# Patient Record
Sex: MALE | Race: WHITE | URBAN - METROPOLITAN AREA
[De-identification: names, ages, dates, MRNs, and addresses within clinical notes are randomized per-mention and may not be internally consistent; named-entity substitution may affect disease eponyms.]

---

## 2019-12-04 ENCOUNTER — TELEPHONE (OUTPATIENT)
Dept: TRANSPLANT | Facility: CLINIC | Age: 49
End: 2019-12-04

## 2019-12-04 NOTE — TELEPHONE ENCOUNTER
"[facility] BREEZE  [id number]      LIVING LIVER DONOR EVALUATION  Donor First Name Jovanni Donor MRN    Donor Middle Name Breezy Completed 2019 9:32 AM   Donor Last Name Diana Record Id [id number]    1970     BREEZE Screen PASSED     Intended Recipient  Recipient First Name Po Relationship Full Sibling   Recipient Last Name Diana Recipient Diagnosis    Recipient  1972 Recipient Insurance Provider    Recipient MRN  Recipient Insurance Type    Recipient Height      Recipient Weight      Recipient's ABO      Donor Information  Age 49 Race    Ht 188 cm (6' 2'') Ethnicity Not /   Wt 96.6 kg (213 lbs) Preferred Language English   BMI 27.40 kg/m   Required No   Gender Male Blood Type A   Demographics  Home Address [address] [place] [address] # [phone]   [facility] # [phone]   [facility] [address] Type Work - Private   Country [place] Preferred Contact day Mon, Fri, Thur, Wed, Tue   Email [email or web address] Preferred Contact time 11:00 AM-1:00 PM, 1:00 PM-4:00 PM, 09:00 AM-11:00 AM   &&   Donor's Medical Information  Medical History None Reported Medications None Reported   Surgical History Circumcision Allergies NKDA   Social History EtOH: Daily (1-2 drinks/day)   Illicit Drug Use: Denies   Tobacco: Denies Self-Reported Functional Status \"I am able to participate in moderate recreational activities like golf, double tennis, dancing, throwing a baseball or football\"   Family Medical History Cancer (Father)   Clots or Clotting Disease (Sibling, Father, Cousin)   Diabetes (Grandparent)   Heart Disease (Father, Aunt or Uncle)   Hypertension (Father, Sibling, Grandparent, Aunt or Uncle)   Inflammatory Bowel Disease (denies)   Liver Disease (Sibling)   Mental Illness (denies) Exercise Frequency Exercise (2 X per week)   Review of Organ Systems  Review of Systems Airway or Lungs: No   Blood Disorder: No   Cancer: No "   Diabetes,Thyroid,Adrenal,Endocrine Disorder: No   Digestive or Liver: No   Heart or Circulatory System: No   Immune Diseases: No   Kidneys and Bladder: No   Male Health: No   Muscles,Bones,Joints: No   Neuro: No   Psych: No   &&   Donor's Social Information  Marital Status  Level of Education Some college education   Medical Insurance Status Has medical insurance Employment Status Full Time   Living Accommodation Lives in rented accommodation Employer [email or web address]   Living Arrangement With spouse Occupation    High Risk Behaviors Blood transfusion < 12 months. (NO)   Commercial sex < 12 months. (NO)   Illicit IV drug use < 5yrs. (NO)   Male:male sexual contact < 5yrs. (NO)   Other high risk sexual contact < 12 months. (NO)     Reason for Donation  Referral Tx Candidate Reason for Donation Because its my sister.   Permission to Disclose Inquiry  Patient Comments    Donor Motivation Highly motivated donor     PCP Contact  PCP Name [facility]   PCP [facility]   PCP Phone [phone]   Emergency Contact  First Name Laura First Name Shelly   Last Name Micaela Last Name Micaela   Phone # [phone] Phone # [phone]   Phone Type Mobile Phone Type Mobile   Relationship Spouse Relationship Mother   Office Use  Reviewed By    Reviewed 11/27/2019   Admin Folder Accept   Comments    Lost for Followup    Extended Comments    BREEZE ID [email or web address]:[id number]   survey status completed   Activity History  Call  Task    Due Date 12/4/2019   Last Modified Date/Time 12/4/2019 7:40 AM   Comments

## 2019-12-09 ENCOUNTER — MEDICAL CORRESPONDENCE (OUTPATIENT)
Dept: HEALTH INFORMATION MANAGEMENT | Facility: CLINIC | Age: 49
End: 2019-12-09

## 2019-12-18 ENCOUNTER — TELEPHONE (OUTPATIENT)
Dept: TRANSPLANT | Facility: CLINIC | Age: 49
End: 2019-12-18

## 2019-12-18 NOTE — TELEPHONE ENCOUNTER
Angelo is having a VERY hard time getting labs done.Everyplace in [place] wants his insurance and money up front. His wife(who's a )advised him NOT to do that.She is also apprehensive about him being a donor.Angelo agreed to be on back burner as we test other donors.